# Patient Record
Sex: FEMALE | URBAN - METROPOLITAN AREA
[De-identification: names, ages, dates, MRNs, and addresses within clinical notes are randomized per-mention and may not be internally consistent; named-entity substitution may affect disease eponyms.]

---

## 2022-06-17 ENCOUNTER — OFFICE VISIT (OUTPATIENT)
Dept: URBAN - METROPOLITAN AREA CLINIC 64 | Facility: CLINIC | Age: 57
End: 2022-06-17
Payer: MEDICARE

## 2022-06-17 DIAGNOSIS — D48.1 NEOPLASM OF UNCERTIAN BEHAVIOR OF EYELID: Primary | ICD-10-CM

## 2022-06-17 DIAGNOSIS — H25.812 COMBINED FORMS OF AGE-RELATED CATARACT, LEFT EYE: ICD-10-CM

## 2022-06-17 PROCEDURE — 99204 OFFICE O/P NEW MOD 45 MIN: CPT | Performed by: OPTOMETRIST

## 2022-06-17 ASSESSMENT — INTRAOCULAR PRESSURE
OD: 16
OS: 16

## 2022-06-17 ASSESSMENT — KERATOMETRY
OS: 45.30
OD: 44.95

## 2022-06-17 NOTE — IMPRESSION/PLAN
Impression: Combined forms of age-related cataract, left eye: H25.812. Plan: Disc. Recommend cataract sx.   Px wants growth removed from OD first.

## 2022-06-28 ENCOUNTER — PROCEDURE (OUTPATIENT)
Dept: URBAN - METROPOLITAN AREA CLINIC 64 | Facility: CLINIC | Age: 57
End: 2022-06-28
Payer: MEDICARE

## 2022-06-28 DIAGNOSIS — H25.812 COMBINED FORMS OF AGE-RELATED CATARACT, LEFT EYE: ICD-10-CM

## 2022-06-28 DIAGNOSIS — D48.1 NEOPLASM OF UNCERTIAN BEHAVIOR OF EYELID: Primary | ICD-10-CM

## 2022-06-28 PROCEDURE — 99213 OFFICE O/P EST LOW 20 MIN: CPT | Performed by: STUDENT IN AN ORGANIZED HEALTH CARE EDUCATION/TRAINING PROGRAM

## 2022-06-28 RX ORDER — NEOMYCIN SULFATE, POLYMYXIN B SULFATE AND DEXAMETHASONE 3.5; 10000; 1 MG/G; [USP'U]/G; MG/G
OINTMENT OPHTHALMIC
Qty: 1 | Refills: 0 | Status: ACTIVE
Start: 2022-06-28

## 2022-06-28 NOTE — IMPRESSION/PLAN
Impression: Combined forms of age-related cataract, left eye: H25.812.  Plan: Recommend follow up with [address] [place] [address] in 1-2 months

## 2022-06-28 NOTE — IMPRESSION/PLAN
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 Plan for excision today in minor room. Informed Consent obtained. One drop of Proparacaine 0.5% was given into the operative eye. Betadine was applied to periocular area. Lidocaine injection was given into the lid. The lesion was excised from the eyelid using the Surgitron. Hemostasis was obtained. Erythromycin ointment was applied to wound afterward. Rx maxitrol QID for 2 weeks. Instructions were given to patient.  Follow up as needed

## 2022-08-10 ENCOUNTER — OFFICE VISIT (OUTPATIENT)
Dept: URBAN - METROPOLITAN AREA CLINIC 64 | Facility: CLINIC | Age: 57
End: 2022-08-10
Payer: MEDICARE

## 2022-08-10 DIAGNOSIS — H25.812 COMBINED FORMS OF AGE-RELATED CATARACT, LEFT EYE: Primary | ICD-10-CM

## 2022-08-10 PROCEDURE — 99213 OFFICE O/P EST LOW 20 MIN: CPT | Performed by: OPTOMETRIST

## 2022-08-10 ASSESSMENT — VISUAL ACUITY
OS: 20/25
OD: 20/25

## 2022-08-10 ASSESSMENT — INTRAOCULAR PRESSURE
OD: 17
OS: 21

## 2022-08-10 NOTE — IMPRESSION/PLAN
Impression: Combined forms of age-related cataract, left eye: H25.812. Plan: No treatment currently recommended due to level of vision will recheck in 6 months with glare test. Patient will monitor vision changes and contact us with any decrease in vision, will re-evaluate cataract on next visit.

## 2023-11-06 ENCOUNTER — OFFICE VISIT (OUTPATIENT)
Dept: URBAN - METROPOLITAN AREA CLINIC 64 | Facility: LOCATION | Age: 58
End: 2023-11-06
Payer: MEDICARE

## 2023-11-06 DIAGNOSIS — H25.812 COMBINED FORMS OF AGE-RELATED CATARACT, LEFT EYE: Primary | ICD-10-CM

## 2023-11-06 PROCEDURE — 99214 OFFICE O/P EST MOD 30 MIN: CPT | Performed by: OPTOMETRIST

## 2023-11-06 ASSESSMENT — VISUAL ACUITY
OS: CF 3FT
OD: 20/30

## 2023-11-06 ASSESSMENT — KERATOMETRY
OD: 44.83
OS: 45.30

## 2023-11-06 ASSESSMENT — INTRAOCULAR PRESSURE
OD: 11
OS: 8

## 2023-11-28 ENCOUNTER — OFFICE VISIT (OUTPATIENT)
Dept: URBAN - METROPOLITAN AREA CLINIC 64 | Facility: LOCATION | Age: 58
End: 2023-11-28
Payer: MEDICARE

## 2023-11-28 DIAGNOSIS — H52.223 REGULAR ASTIGMATISM, BILATERAL: ICD-10-CM

## 2023-11-28 PROCEDURE — 99214 OFFICE O/P EST MOD 30 MIN: CPT | Performed by: STUDENT IN AN ORGANIZED HEALTH CARE EDUCATION/TRAINING PROGRAM

## 2023-11-28 ASSESSMENT — VISUAL ACUITY
OD: 20/40
OS: CF 1FT

## 2023-12-15 ENCOUNTER — TECH ONLY (OUTPATIENT)
Dept: URBAN - METROPOLITAN AREA CLINIC 64 | Facility: LOCATION | Age: 58
End: 2023-12-15
Payer: MEDICARE

## 2023-12-15 DIAGNOSIS — H25.813 COMBINED FORMS OF AGE-RELATED CATARACT, BILATERAL: Primary | ICD-10-CM

## 2023-12-20 ENCOUNTER — SURGERY (OUTPATIENT)
Dept: URBAN - METROPOLITAN AREA SURGERY 42 | Facility: LOCATION | Age: 58
End: 2023-12-20
Payer: MEDICARE

## 2023-12-20 PROCEDURE — 66984 XCAPSL CTRC RMVL W/O ECP: CPT | Performed by: STUDENT IN AN ORGANIZED HEALTH CARE EDUCATION/TRAINING PROGRAM

## 2023-12-20 RX ORDER — KETOROLAC TROMETHAMINE 5 MG/ML
0.5 % SOLUTION OPHTHALMIC
Qty: 5 | Refills: 1 | Status: INACTIVE
Start: 2023-12-20 | End: 2024-01-12

## 2023-12-21 ENCOUNTER — POST-OPERATIVE VISIT (OUTPATIENT)
Dept: URBAN - METROPOLITAN AREA CLINIC 64 | Facility: LOCATION | Age: 58
End: 2023-12-21
Payer: MEDICARE

## 2023-12-21 DIAGNOSIS — Z48.810 ENCOUNTER FOR SURGICAL AFTERCARE FOLLOWING SURGERY ON A SENSE ORGAN: Primary | ICD-10-CM

## 2023-12-21 PROCEDURE — 99024 POSTOP FOLLOW-UP VISIT: CPT | Performed by: OPTOMETRIST

## 2023-12-21 ASSESSMENT — INTRAOCULAR PRESSURE: OS: 14

## 2023-12-28 ENCOUNTER — POST-OPERATIVE VISIT (OUTPATIENT)
Dept: URBAN - METROPOLITAN AREA CLINIC 64 | Facility: LOCATION | Age: 58
End: 2023-12-28
Payer: MEDICARE

## 2023-12-28 PROCEDURE — 99024 POSTOP FOLLOW-UP VISIT: CPT | Performed by: OPTOMETRIST

## 2023-12-28 ASSESSMENT — INTRAOCULAR PRESSURE
OD: 12
OS: 14

## 2024-01-10 ENCOUNTER — SURGERY (OUTPATIENT)
Dept: URBAN - METROPOLITAN AREA SURGERY 42 | Facility: LOCATION | Age: 59
End: 2024-01-10
Payer: MEDICARE

## 2024-01-10 PROCEDURE — 66984 XCAPSL CTRC RMVL W/O ECP: CPT | Performed by: STUDENT IN AN ORGANIZED HEALTH CARE EDUCATION/TRAINING PROGRAM

## 2024-01-17 ENCOUNTER — POST-OPERATIVE VISIT (OUTPATIENT)
Dept: URBAN - METROPOLITAN AREA CLINIC 64 | Facility: LOCATION | Age: 59
End: 2024-01-17
Payer: MEDICARE

## 2024-01-17 DIAGNOSIS — H52.13 MYOPIA, BILATERAL: ICD-10-CM

## 2024-01-17 PROCEDURE — 99024 POSTOP FOLLOW-UP VISIT: CPT | Performed by: OPTOMETRIST

## 2024-01-17 PROCEDURE — 92015 DETERMINE REFRACTIVE STATE: CPT | Performed by: OPTOMETRIST

## 2024-01-17 RX ORDER — KETOROLAC TROMETHAMINE 5 MG/ML
0.5 % SOLUTION OPHTHALMIC
Qty: 5 | Refills: 0 | Status: ACTIVE
Start: 2024-01-17

## 2024-01-17 ASSESSMENT — VISUAL ACUITY
OD: 20/20
OS: 20/20

## 2024-01-17 ASSESSMENT — INTRAOCULAR PRESSURE
OS: 16
OD: 16

## 2024-01-29 ENCOUNTER — POST-OPERATIVE VISIT (OUTPATIENT)
Dept: URBAN - METROPOLITAN AREA CLINIC 64 | Facility: LOCATION | Age: 59
End: 2024-01-29
Payer: MEDICARE

## 2024-01-29 PROCEDURE — 99024 POSTOP FOLLOW-UP VISIT: CPT | Performed by: OPTOMETRIST

## 2024-01-29 ASSESSMENT — INTRAOCULAR PRESSURE
OD: 11
OS: 13

## 2024-05-15 ENCOUNTER — OFFICE VISIT (OUTPATIENT)
Dept: URBAN - METROPOLITAN AREA CLINIC 64 | Facility: LOCATION | Age: 59
End: 2024-05-15
Payer: MEDICARE

## 2024-05-15 DIAGNOSIS — H26.492 OTHER SECONDARY CATARACT, LEFT EYE: Primary | ICD-10-CM

## 2024-05-15 PROCEDURE — 99213 OFFICE O/P EST LOW 20 MIN: CPT | Performed by: OPTOMETRIST

## 2024-05-15 ASSESSMENT — INTRAOCULAR PRESSURE
OD: 11
OS: 11

## 2025-07-23 ENCOUNTER — OFFICE VISIT (OUTPATIENT)
Dept: URBAN - METROPOLITAN AREA CLINIC 64 | Facility: LOCATION | Age: 60
End: 2025-07-23
Payer: MEDICARE

## 2025-07-23 DIAGNOSIS — H26.493 OTHER SECONDARY CATARACT, BILATERAL: Primary | ICD-10-CM

## 2025-07-23 PROCEDURE — 92014 COMPRE OPH EXAM EST PT 1/>: CPT | Performed by: OPTOMETRIST

## 2025-07-23 ASSESSMENT — INTRAOCULAR PRESSURE
OD: 12
OS: 15